# Patient Record
Sex: FEMALE | Race: OTHER | Employment: OTHER | URBAN - METROPOLITAN AREA
[De-identification: names, ages, dates, MRNs, and addresses within clinical notes are randomized per-mention and may not be internally consistent; named-entity substitution may affect disease eponyms.]

---

## 2022-11-10 ENCOUNTER — NEW PATIENT (OUTPATIENT)
Dept: URBAN - METROPOLITAN AREA CLINIC 39 | Facility: CLINIC | Age: 47
End: 2022-11-10

## 2022-11-10 DIAGNOSIS — H52.203: ICD-10-CM

## 2022-11-10 DIAGNOSIS — H52.03: ICD-10-CM

## 2022-11-10 DIAGNOSIS — H52.4: ICD-10-CM

## 2022-11-10 PROCEDURE — 92015 DETERMINE REFRACTIVE STATE: CPT

## 2022-11-10 PROCEDURE — 92004 COMPRE OPH EXAM NEW PT 1/>: CPT

## 2022-11-10 ASSESSMENT — VISUAL ACUITY
OD_CC: J1+
OU_SC: J1+
OU_SC: 20/50-1
OD_SC: 20/60+1
OS_CC: 20/25
OS_SC: J1
OD_SC: J5
OU_CC: 20/20
OD_CC: 20/20
OS_CC: J1
OS_SC: 20/60
OU_CC: J1+

## 2022-11-10 ASSESSMENT — TONOMETRY
OD_IOP_MMHG: 25
OS_IOP_MMHG: 25

## 2022-12-19 ENCOUNTER — FOLLOW UP (OUTPATIENT)
Dept: URBAN - METROPOLITAN AREA CLINIC 39 | Facility: CLINIC | Age: 47
End: 2022-12-19

## 2022-12-19 PROCEDURE — 92020 GONIOSCOPY: CPT

## 2022-12-19 PROCEDURE — 92012 INTRM OPH EXAM EST PATIENT: CPT

## 2022-12-19 PROCEDURE — 92083 EXTENDED VISUAL FIELD XM: CPT

## 2022-12-19 PROCEDURE — 76514 ECHO EXAM OF EYE THICKNESS: CPT

## 2022-12-19 ASSESSMENT — VISUAL ACUITY
OS_CC: 20/40
OD_CC: 20/60+1
OU_CC: 20/30

## 2022-12-19 ASSESSMENT — PACHYMETRY
OD_CT_UM: 648
OS_CT_UM: 659

## 2022-12-19 ASSESSMENT — TONOMETRY
OS_IOP_MMHG: 24
OD_IOP_MMHG: 23

## 2023-10-06 ENCOUNTER — NEW PATIENT (OUTPATIENT)
Dept: URBAN - METROPOLITAN AREA CLINIC 42 | Facility: CLINIC | Age: 48
End: 2023-10-06

## 2023-10-06 DIAGNOSIS — H52.4: ICD-10-CM

## 2023-10-06 DIAGNOSIS — H40.053: ICD-10-CM

## 2023-10-06 DIAGNOSIS — H52.223: ICD-10-CM

## 2023-10-06 DIAGNOSIS — H52.13: ICD-10-CM

## 2023-10-06 DIAGNOSIS — E11.9: ICD-10-CM

## 2023-10-06 PROCEDURE — 92015 DETERMINE REFRACTIVE STATE: CPT

## 2023-10-06 PROCEDURE — 92004 COMPRE OPH EXAM NEW PT 1/>: CPT

## 2023-10-06 ASSESSMENT — TONOMETRY
OS_IOP_MMHG: 25
OD_IOP_MMHG: 22

## 2023-10-06 ASSESSMENT — KERATOMETRY
OS_AXISANGLE_DEGREES: 178
OS_AXISANGLE2_DEGREES: 88
OD_AXISANGLE2_DEGREES: 82
OD_K2POWER_DIOPTERS: 42.25
OS_K1POWER_DIOPTERS: 43.50
OD_K1POWER_DIOPTERS: 43.50
OD_AXISANGLE_DEGREES: 172
OS_K2POWER_DIOPTERS: 42.50

## 2024-10-11 ENCOUNTER — PREPPED CHART (OUTPATIENT)
Dept: URBAN - METROPOLITAN AREA CLINIC 42 | Facility: CLINIC | Age: 49
End: 2024-10-11

## 2024-10-11 DIAGNOSIS — H52.4: ICD-10-CM

## 2024-10-11 DIAGNOSIS — H52.223: ICD-10-CM

## 2024-10-11 DIAGNOSIS — H40.053: ICD-10-CM

## 2024-10-11 DIAGNOSIS — H52.13: ICD-10-CM

## 2024-10-11 DIAGNOSIS — E11.9: ICD-10-CM

## 2024-10-11 PROCEDURE — 92015 DETERMINE REFRACTIVE STATE: CPT

## 2024-10-11 PROCEDURE — 92014 COMPRE OPH EXAM EST PT 1/>: CPT
